# Patient Record
Sex: FEMALE | Race: WHITE | NOT HISPANIC OR LATINO | URBAN - METROPOLITAN AREA
[De-identification: names, ages, dates, MRNs, and addresses within clinical notes are randomized per-mention and may not be internally consistent; named-entity substitution may affect disease eponyms.]

---

## 2019-11-11 ENCOUNTER — APPOINTMENT (RX ONLY)
Dept: URBAN - METROPOLITAN AREA CLINIC 12 | Facility: CLINIC | Age: 24
Setting detail: DERMATOLOGY
End: 2019-11-11

## 2019-11-11 DIAGNOSIS — L90.5 SCAR CONDITIONS AND FIBROSIS OF SKIN: ICD-10-CM

## 2019-11-11 PROBLEM — F41.9 ANXIETY DISORDER, UNSPECIFIED: Status: ACTIVE | Noted: 2019-11-11

## 2019-11-11 PROCEDURE — ? PATIENT SPECIFIC COUNSELING

## 2019-11-11 PROCEDURE — ? COUNSELING - SCAR (COSMETIC)

## 2019-11-11 ASSESSMENT — LOCATION SIMPLE DESCRIPTION DERM: LOCATION SIMPLE: NOSE

## 2019-11-11 ASSESSMENT — LOCATION DETAILED DESCRIPTION DERM: LOCATION DETAILED: NASAL TIP

## 2019-11-11 ASSESSMENT — LOCATION ZONE DERM: LOCATION ZONE: NOSE

## 2019-11-11 NOTE — PROCEDURE: PATIENT SPECIFIC COUNSELING
Detail Level: Zone
Other (Free Text): Patient presents with a scar on the tip of her nose from a mole shaving done in 2016. Patient mentioned she’s had PRP, fraxel , laser treatment, subcision, and acutane none of which have worked. \\n\\Estefanía Castaneda examined and explained that she needs to stay away from lasers , and surgery as those both will not benefit her due to her skin type. Recommended microneedling with PRP or growth factor explained that this procedure is not a one and done, she will need at least 4-8 treatments spaced out 1 month apart. Explained that she will look red and weepy but it will resolve. Dr. Castaneda kindly explained that he cannot guarantee that microneedling will completely fade the scar, however it will help and will not make it worse.  Patient verbalized understanding. \\n\\nPhotos taken, quote given.

## 2019-11-11 NOTE — PROCEDURE: COUNSELING - SCAR (COSMETIC)
Detail Level: Detailed
Procedure Quote Preamble: The recommended treatment regimen includes all modalities selected and is global for the treatments during a 6 month period. Treatment is not billable to insurance. Treatment cost: $
Send Procedure Quote As Charge?: No
Consultation Charge $ (Use Numbers Only, No Text Please.): 120.00

## 2024-10-21 ENCOUNTER — APPOINTMENT (RX ONLY)
Dept: URBAN - METROPOLITAN AREA CLINIC 12 | Facility: CLINIC | Age: 29
Setting detail: DERMATOLOGY
End: 2024-10-21

## 2024-10-21 DIAGNOSIS — Z41.1 ENCOUNTER FOR COSMETIC SURGERY: ICD-10-CM

## 2024-10-21 PROCEDURE — ? CONSULTATION - RHINOPLASTY

## 2024-10-21 PROCEDURE — ? PATIENT SPECIFIC COUNSELING

## 2024-10-21 ASSESSMENT — LOCATION SIMPLE DESCRIPTION DERM
LOCATION SIMPLE: NOSE
LOCATION SIMPLE: NOSE

## 2024-10-21 ASSESSMENT — LOCATION ZONE DERM
LOCATION ZONE: NOSE
LOCATION ZONE: NOSE

## 2024-10-21 ASSESSMENT — LOCATION DETAILED DESCRIPTION DERM
LOCATION DETAILED: NASAL SUPRATIP
LOCATION DETAILED: NASAL SUPRATIP

## 2024-10-21 NOTE — PROCEDURE: PATIENT SPECIFIC COUNSELING
Other (Free Text): Patient presents for a rhinoplasty consultation. Patient voiced concerns about a bulbous tip, wide nasal bones, wide when smiling, droopiness, and bump from side profile. Pt voiced breathing with mouth when sleeping. Pt has had a mole removal on her nose in the past. \\n\\nDrEvens Castaneda examined the patient and voiced:\\n- internal valve collapse \\n- turbinate enlarged on the right\\n- mid vault septum deviation to the right (significant)\\n- Turbinate enlarged on the left\\n- Speculum on the left\\n\\n- procedure will be done to ensure patient’s improved breathing\\n- grafting to support valve and ensure valve doesn’t collapse\\n- tip definition: release the muscle responsible for dragging the nose tip down \\n- recovery: tapes and metal splints, sheets, sutures; will come off in a week\\n- swelling and bruising will go away in 2 weeks \\n- pt can do liposuction under the chin to bring the chin forward\\n- canthal distance and nostril width will be equivalent when narrowing the nostrils\\n- chin augmentation: putting in an porex implant to bring tissue forward; or chin advancement with a titanium plate (preferred) won’t affect pt’s TMJ\\n\\nPt voiced understanding\\nDana will give a quote
Detail Level: Simple